# Patient Record
Sex: MALE | Race: WHITE | Employment: UNEMPLOYED | URBAN - METROPOLITAN AREA
[De-identification: names, ages, dates, MRNs, and addresses within clinical notes are randomized per-mention and may not be internally consistent; named-entity substitution may affect disease eponyms.]

---
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
## 2019-05-06 ENCOUNTER — APPOINTMENT (OUTPATIENT)
Dept: GENERAL RADIOLOGY | Facility: CLINIC | Age: 19
End: 2019-05-06
Attending: PHYSICIAN ASSISTANT
Payer: OTHER MISCELLANEOUS

## 2019-05-06 ENCOUNTER — HOSPITAL ENCOUNTER (EMERGENCY)
Facility: CLINIC | Age: 19
Discharge: HOME | End: 2019-05-06
Attending: PHYSICIAN ASSISTANT | Admitting: PHYSICIAN ASSISTANT
Payer: OTHER MISCELLANEOUS

## 2019-05-06 VITALS
WEIGHT: 180 LBS | BODY MASS INDEX: 23.11 KG/M2 | SYSTOLIC BLOOD PRESSURE: 114 MMHG | OXYGEN SATURATION: 99 % | TEMPERATURE: 97.8 F | DIASTOLIC BLOOD PRESSURE: 67 MMHG | RESPIRATION RATE: 16 BRPM

## 2019-05-06 DIAGNOSIS — S60.012A CONTUSION OF LEFT THUMB: ICD-10-CM

## 2019-05-06 DIAGNOSIS — S60.10XA SUBUNGUAL HEMATOMA OF DIGIT OF HAND: ICD-10-CM

## 2019-05-06 PROCEDURE — 99283 EMERGENCY DEPT VISIT LOW MDM: CPT | Mod: 25 | Performed by: PHYSICIAN ASSISTANT

## 2019-05-06 PROCEDURE — 29125 APPL SHORT ARM SPLINT STATIC: CPT | Mod: LT | Performed by: PHYSICIAN ASSISTANT

## 2019-05-06 PROCEDURE — 99284 EMERGENCY DEPT VISIT MOD MDM: CPT | Mod: 25 | Performed by: PHYSICIAN ASSISTANT

## 2019-05-06 PROCEDURE — 11740 EVACUATION SUBUNGUAL HMTMA: CPT | Mod: FA | Performed by: PHYSICIAN ASSISTANT

## 2019-05-06 PROCEDURE — 73140 X-RAY EXAM OF FINGER(S): CPT | Mod: TC,LT

## 2019-05-06 NOTE — ED AVS SNAPSHOT
[facility] Emergency Department  [address] [place] [address]  Phone:  [phone]  Fax:  [phone]                                    Adi Limon   MRN: [id number]    Department:  [facility] Emergency Department   Date of Visit:  5/6/2019           After Visit Summary Signature Page    I have received my discharge instructions, and my questions have been answered. I have discussed any challenges I see with this plan with the nurse or doctor.    ..........................................................................................................................................  Patient/Patient Representative Signature      ..........................................................................................................................................  Patient Representative Print Name and Relationship to Patient    ..................................................               ................................................  Date                                   Time    ..........................................................................................................................................  Reviewed by Signature/Title    ...................................................              ..............................................  Date                                               Time          22EPIC Rev 08/18

## 2019-05-07 NOTE — ED PROVIDER NOTES
History     Chief Complaint   Patient presents with     Thumb Discomfort     HPI  Adi Limon is a 19 year old male who presents for evaluation of left thumb discomfort.  Initial injury was 2 months prior when he smashed his finger.  Developed a significant subungual hematoma, and he heated up a piece of metal and open the nail plate himself.  This did relieve his discomfort.  Has had some decreased range of motion since then, but has been able to work and function okay.  2 days ago he was using an impact wrench and it accidentally slipped off the not that he was working on.  It struck his left nail plate of the thumb again.  Has had increased pain, swelling, and decreased range of motion ever since this injury.  Reports his pain about 6 on a scale of 10.  Has attempted ibuprofen for the pain.        Allergies:  Allergies   Allergen Reactions     No Known Allergies        Problem List:    Patient Active Problem List    Diagnosis Date Noted     ADD (attention deficit hyperactivity disorder, inattentive type) 01/05/2016     Priority: Medium     Memory deficit 01/05/2016     Priority: Medium     Allergic rhinitis 04/21/2008     Priority: Medium     Problem list name updated by automated process. Provider to review          Past Medical History:    Past Medical History:   Diagnosis Date     NO ACTIVE PROBLEMS      Personal history of multiple concussions        Past Surgical History:    Past Surgical History:   Procedure Laterality Date     HC REMOVE TONSILS/ADENOIDS,<11 Y/O  7/5/2005    T & A <11y.o.       Family History:    History reviewed. No pertinent family history.    Social History:  Marital Status:  Single [1]  Social History     Tobacco Use     Smoking status: Never Smoker     Smokeless tobacco: Never Used   Substance Use Topics     Alcohol use: No     Drug use: No        Medications:      Acetaminophen (TYLENOL PO)   fexofenadine (ALLEGRA) 30 MG tablet   HYDROcodone-acetaminophen (NORCO) 5-325 MG per tablet    IBUPROFEN PO   naproxen (NAPROSYN) 500 MG tablet         Review of Systems   All other systems reviewed and are negative.      Physical Exam   BP: 114/67  Heart Rate: 91  Temp: 97.8  F (36.6  C)  Resp: 16  Weight: 81.6 kg (180 lb)  SpO2: 99 %      Physical Exam   Nursing note and vitals reviewed.  Left thumb with minor swelling of the distal phalanx.  Significant subungual hematoma.  Decreased range of motion with flexion and extension secondary to pain.  He is able to move the thumb.  MCP joint without tenderness.  Remainder the hand normal to inspection and no tenderness to palpation.    ED Course        Procedures               Critical Care time:  none               Results for orders placed or performed during the hospital encounter of 05/06/19 (from the past 24 hour(s))   XR Finger Left G/E 2 Views    Narrative    LEFT FINGER TWO OR MORE VIEWS  5/6/2019 7:32 PM     HISTORY: Trauma.    COMPARISON: None.      Impression    IMPRESSION: No bony or soft tissue abnormality.       SADE LAW MD       Medications - No data to display    Assessments & Plan (with Medical Decision Making)     Contusion of left thumb  Subungual hematoma of digit of hand     19 year old male presents for evaluation of increased thumb discomfort after reinjuring it yesterday.  He was using an impact wrench and excellently slipped and struck his distal thumb.  Previous injury in February where he released a subungual hematoma on his own.  Notes increased pain, swelling, and decreased range of motion since his injury yesterday.  On exam vital signs normal.  Significant subungual hematoma noted.  Minor swelling of the distal phalanx of the thumb.  He does have fair range of motion of the thumb, but decreased secondary to pain and swelling.  Sensation intact.  X-ray negative for fracture.  We did treat the subungual hematoma with a cautery pen.  Hold burns through the nail plate without complication and there is a fair amount of blood  that did come out.  Bacitracin ointment and a bandage applied.  We soaked his thumb in hot water for 20 minutes prior to putting a bandage on.  He was given a thumb spica brace to support his stomach prevent further injury given his job as a .  The patient does not want any work restrictions.  Return in 1 week if symptoms worsening or not improving.  I encouraged him to soak his thumb at home for 20 minutes every 1-2 hours this evening.  Ibuprofen 600 mg every 6 hours as needed for discomfort.  Elevate the hand is much as possible.  The patient was in agreement with this plan and was suitable for discharge.     I have reviewed the nursing notes.    I have reviewed the findings, diagnosis, plan and need for follow up with the patient.          Medication List      There are no discharge medications for this visit.         Final diagnoses:   Contusion of left thumb   Subungual hematoma of digit of hand       Disclaimer: This note consists of symbols derived from keyboarding, dictation and/or voice recognition software. As a result, there may be errors in the script that have gone undetected. Please consider this when interpreting information found in this chart.      5/6/2019   Tommie Troy PA-C   [facility] [place] EMERGENCY DEPARTMENT     Tommie Troy PA-C  05/07/19 0022

## 2019-05-07 NOTE — ED TRIAGE NOTES
Injured left thumb in February.  Friday re injured it and it was hurting a lot until Saturday when it stopped hurting and now it is just tingling and concern for decreased ROM of thumb.

## 2019-05-07 NOTE — DISCHARGE INSTRUCTIONS
Please soak your finger in warm water for 20 minutes every 1-2 hours tonight.    Use a bandage with bacitracin in between soakings tonight.    Use the brace to support your finger while it heals.

## 2020-07-28 ENCOUNTER — APPOINTMENT (OUTPATIENT)
Dept: ULTRASOUND IMAGING | Facility: CLINIC | Age: 20
End: 2020-07-28
Attending: NURSE PRACTITIONER
Payer: COMMERCIAL

## 2020-07-28 ENCOUNTER — HOSPITAL ENCOUNTER (EMERGENCY)
Facility: CLINIC | Age: 20
Discharge: HOME | End: 2020-07-28
Attending: NURSE PRACTITIONER | Admitting: NURSE PRACTITIONER
Payer: COMMERCIAL

## 2020-07-28 ENCOUNTER — VIRTUAL VISIT (OUTPATIENT)
Dept: FAMILY MEDICINE | Facility: OTHER | Age: 20
End: 2020-07-28
Payer: COMMERCIAL

## 2020-07-28 VITALS
WEIGHT: 185.9 LBS | BODY MASS INDEX: 23.87 KG/M2 | HEART RATE: 78 BPM | DIASTOLIC BLOOD PRESSURE: 67 MMHG | TEMPERATURE: 98.6 F | SYSTOLIC BLOOD PRESSURE: 121 MMHG | RESPIRATION RATE: 16 BRPM | OXYGEN SATURATION: 97 %

## 2020-07-28 DIAGNOSIS — R10.13 EPIGASTRIC PAIN: Primary | ICD-10-CM

## 2020-07-28 DIAGNOSIS — R10.13 EPIGASTRIC PAIN: ICD-10-CM

## 2020-07-28 DIAGNOSIS — R11.0 NAUSEA: ICD-10-CM

## 2020-07-28 LAB
ALBUMIN SERPL-MCNC: 4.6 G/DL (ref 3.4–5)
ALP SERPL-CCNC: 75 U/L (ref 40–150)
ALT SERPL W P-5'-P-CCNC: 29 U/L (ref 0–70)
ANION GAP SERPL CALCULATED.3IONS-SCNC: 4 MMOL/L (ref 3–14)
AST SERPL W P-5'-P-CCNC: 15 U/L (ref 0–45)
BASOPHILS # BLD AUTO: 0.1 10E9/L (ref 0–0.2)
BASOPHILS NFR BLD AUTO: 0.7 %
BILIRUB SERPL-MCNC: 0.7 MG/DL (ref 0.2–1.3)
BUN SERPL-MCNC: 14 MG/DL (ref 7–30)
CALCIUM SERPL-MCNC: 9.4 MG/DL (ref 8.5–10.1)
CHLORIDE SERPL-SCNC: 109 MMOL/L (ref 94–109)
CO2 SERPL-SCNC: 29 MMOL/L (ref 20–32)
CREAT SERPL-MCNC: 0.87 MG/DL (ref 0.66–1.25)
CRP SERPL-MCNC: <2.9 MG/L (ref 0–8)
DIFFERENTIAL METHOD BLD: NORMAL
EOSINOPHIL NFR BLD AUTO: 4.6 %
ERYTHROCYTE [DISTWIDTH] IN BLOOD BY AUTOMATED COUNT: 12.1 % (ref 10–15)
GFR SERPL CREATININE-BSD FRML MDRD: >90 ML/MIN/{1.73_M2}
GLUCOSE SERPL-MCNC: 77 MG/DL (ref 70–99)
HCT VFR BLD AUTO: 43.7 % (ref 40–53)
HGB BLD-MCNC: 15.5 G/DL (ref 13.3–17.7)
IMM GRANULOCYTES # BLD: 0 10E9/L (ref 0–0.4)
IMM GRANULOCYTES NFR BLD: 0.2 %
LIPASE SERPL-CCNC: 57 U/L (ref 73–393)
LYMPHOCYTES # BLD AUTO: 3.2 10E9/L (ref 0.8–5.3)
LYMPHOCYTES NFR BLD AUTO: 36.4 %
MCH RBC QN AUTO: 32.4 PG (ref 26.5–33)
MCHC RBC AUTO-ENTMCNC: 35.5 G/DL (ref 31.5–36.5)
MCV RBC AUTO: 91 FL (ref 78–100)
MONOCYTES # BLD AUTO: 0.7 10E9/L (ref 0–1.3)
MONOCYTES NFR BLD AUTO: 7.6 %
NEUTROPHILS # BLD AUTO: 4.4 10E9/L (ref 1.6–8.3)
NEUTROPHILS NFR BLD AUTO: 50.5 %
NRBC # BLD AUTO: 0 10*3/UL
NRBC BLD AUTO-RTO: 0 /100
PLATELET # BLD AUTO: 194 10E9/L (ref 150–450)
POTASSIUM SERPL-SCNC: 4.1 MMOL/L (ref 3.4–5.3)
PROT SERPL-MCNC: 7.6 G/DL (ref 6.8–8.8)
RBC # BLD AUTO: 4.79 10E12/L (ref 4.4–5.9)
SODIUM SERPL-SCNC: 142 MMOL/L (ref 133–144)
WBC # BLD AUTO: 8.7 10E9/L (ref 4–11)

## 2020-07-28 PROCEDURE — 80053 COMPREHEN METABOLIC PANEL: CPT | Performed by: NURSE PRACTITIONER

## 2020-07-28 PROCEDURE — 99284 EMERGENCY DEPT VISIT MOD MDM: CPT | Mod: Z6 | Performed by: NURSE PRACTITIONER

## 2020-07-28 PROCEDURE — 99285 EMERGENCY DEPT VISIT HI MDM: CPT | Mod: 25 | Performed by: NURSE PRACTITIONER

## 2020-07-28 PROCEDURE — 96361 HYDRATE IV INFUSION ADD-ON: CPT | Performed by: NURSE PRACTITIONER

## 2020-07-28 PROCEDURE — 25800030 ZZH RX IP 258 OP 636: Performed by: NURSE PRACTITIONER

## 2020-07-28 PROCEDURE — 86140 C-REACTIVE PROTEIN: CPT | Performed by: NURSE PRACTITIONER

## 2020-07-28 PROCEDURE — 96374 THER/PROPH/DIAG INJ IV PUSH: CPT | Performed by: NURSE PRACTITIONER

## 2020-07-28 PROCEDURE — 99203 OFFICE O/P NEW LOW 30 MIN: CPT | Mod: 95 | Performed by: PHYSICIAN ASSISTANT

## 2020-07-28 PROCEDURE — 83690 ASSAY OF LIPASE: CPT | Performed by: NURSE PRACTITIONER

## 2020-07-28 PROCEDURE — 25000128 H RX IP 250 OP 636: Performed by: NURSE PRACTITIONER

## 2020-07-28 PROCEDURE — 85025 COMPLETE CBC W/AUTO DIFF WBC: CPT | Performed by: NURSE PRACTITIONER

## 2020-07-28 PROCEDURE — C9113 INJ PANTOPRAZOLE SODIUM, VIA: HCPCS | Performed by: NURSE PRACTITIONER

## 2020-07-28 PROCEDURE — 76705 ECHO EXAM OF ABDOMEN: CPT

## 2020-07-28 PROCEDURE — 25000132 ZZH RX MED GY IP 250 OP 250 PS 637: Performed by: NURSE PRACTITIONER

## 2020-07-28 RX ORDER — ONDANSETRON 2 MG/ML
4 INJECTION INTRAMUSCULAR; INTRAVENOUS EVERY 30 MIN PRN
Status: DISCONTINUED | OUTPATIENT
Start: 2020-07-28 | End: 2020-07-28 | Stop reason: HOSPADM

## 2020-07-28 RX ORDER — LORAZEPAM 1 MG/1
1 TABLET ORAL ONCE
Status: COMPLETED | OUTPATIENT
Start: 2020-07-28 | End: 2020-07-28

## 2020-07-28 RX ADMIN — PANTOPRAZOLE SODIUM 40 MG: 40 INJECTION, POWDER, FOR SOLUTION INTRAVENOUS at 20:17

## 2020-07-28 RX ADMIN — SODIUM CHLORIDE 1000 ML: 9 INJECTION, SOLUTION INTRAVENOUS at 19:45

## 2020-07-28 RX ADMIN — LORAZEPAM 1 MG: 1 TABLET ORAL at 18:48

## 2020-07-28 NOTE — PROGRESS NOTES
"Adi Limon is a 20 year old male who is being evaluated via a billable telephone visit.      The patient has been notified of following:     \"This telephone visit will be conducted via a call between you and your physician/provider. We have found that certain health care needs can be provided without the need for a physical exam.  This service lets us provide the care you need with a short phone conversation.  If a prescription is necessary we can send it directly to your pharmacy.  If lab work is needed we can place an order for that and you can then stop by our lab to have the test done at a later time.    Telephone visits are billed at different rates depending on your insurance coverage. During this emergency period, for some insurers they may be billed the same as an in-person visit.  Please reach out to your insurance provider with any questions.    If during the course of the call the physician/provider feels a telephone visit is not appropriate, you will not be charged for this service.\"    Patient has given verbal consent for Telephone visit?  Yes    What phone number would you like to be contacted at? [phone]    How would you like to obtain your AVS? Mail a copy    Subjective     Adi Limon is a 20 year old male who presents via phone visit today for the following health issues:    HPI    ABDOMINAL   PAIN     Onset: 3-4 days    Description:   Character: Sharp. Some sharpness, knotted up, persistent 5-6/10, 8-9/10 at times then gets nauseated    Location: epigastric region  Radiation: Back    Intensity: moderate, severe    Progression of Symptoms:  same    Accompanying Signs & Symptoms:  Fever/Chills?: no   Gas/Bloating: no   Nausea: YES  Vomitting: no   Diarrhea?: no   Constipation:no   Dysuria or Hematuria: no   No blood in stool, NL BM   History:   Trauma: no   Previous similar pain: no    Previous tests done: none    Precipitating factors:   Does the pain change with:     Food: YES , a lot worse " with eating    BM: no     Urination: no     Alleviating factors:  none    Therapies Tried and outcome: Peptobismol-- did not help just took it less than an hour ago    LMP:  not applicable    Symptoms started over the weekend, 3 to 4 days ago when he was hiking in [place].  He did jump into the water and got some water in his mouth.  He wonders if this is associated.  He has not had diarrhea.    He has no family history of gallbladder disease.  No known exposure to COVID.  He has not been using alcohol, tobacco, caffeine, or NSAIDs.              Patient Active Problem List   Diagnosis     Allergic rhinitis     ADD (attention deficit hyperactivity disorder, inattentive type)     Memory deficit     Past Surgical History:   Procedure Laterality Date     HC REMOVE TONSILS/ADENOIDS,<13 Y/O  7/5/2005    T & A <11y.o.       Social History     Tobacco Use     Smoking status: Never Smoker     Smokeless tobacco: Never Used   Substance Use Topics     Alcohol use: No     History reviewed. No pertinent family history.      Current Outpatient Medications   Medication Sig Dispense Refill     Acetaminophen (TYLENOL PO) Take 1,000 mg by mouth every 6 hours as needed for mild pain or fever       IBUPROFEN PO Take 400 mg by mouth.         fexofenadine (ALLEGRA) 30 MG tablet Take 1 tablet (30 mg) by mouth 2 times daily Needs allergy visit before more rf (Patient not taking: Reported on 7/28/2020) 60 tablet 0     HYDROcodone-acetaminophen (NORCO) 5-325 MG per tablet Take 1 tablet by mouth every 6 hours as needed for moderate to severe pain (use for breakthru pain only) (Patient not taking: Reported on 7/28/2020) 10 tablet 0     naproxen (NAPROSYN) 500 MG tablet Take 1 tablet (500 mg) by mouth 2 times daily (with meals) (Patient not taking: Reported on 7/28/2020) 60 tablet 1     Allergies   Allergen Reactions     No Known Allergies      BP Readings from Last 3 Encounters:   05/06/19 114/67   06/27/17 139/82   03/16/17 116/66     Wt Readings from Last 3 Encounters:   05/06/19 81.6 kg (180 lb)   06/27/17 83 kg (183 lb) (87 %, Z= 1.13)*   03/16/17 81.8 kg (180 lb 6.4 oz) (86 %, Z= 1.10)*     * Growth percentiles are based on [facility] (Boys, 2-20 Years) data.               Reviewed and updated as needed this visit by Provider         Review of Systems   CONSTITUTIONAL: NEGATIVE for fever, chills, change in weight  ENT/MOUTH: NEGATIVE for ear, mouth and throat problems  RESP: NEGATIVE for significant cough or SOB  CV: NEGATIVE for chest pain, palpitations or peripheral edema  GI: Positive for abdominal pain and nausea as above       Objective   Reported vitals:  There were no vitals taken for this visit.   healthy, alert and no distress  PSYCH: Alert and oriented times 3; coherent speech, normal   rate and volume, able to articulate logical thoughts, able   to abstract reason, no tangential thoughts, no hallucinations   or delusions  His affect is normal and pleasant  RESP: No cough, no audible wheezing, able to talk in full sentences  Remainder of exam unable to be completed due to telephone visits    Diagnostic Test Results:  Labs reviewed in Epic  none         Assessment/Plan:  This visit was completed virtually due to our current COVID 19 pandemic.  The patient will be seen as soon as possible in the office.  If there is any significant change in or worsening of symptoms patient will call in to be triaged, present to the emergency department, or call 911 if acute worsening is noted.    Pt was offered in person visit, he and his mom have decided to start virtually and if symptoms worsen or if he is not improving and because of his pain is not revealed with this visit he will then have in person visit  1. Epigastric pain  Possible etiologies could be gastritis, pancreatitis, gallbladder disease, gastroenteritis, we will start her work-up with labs and ultrasound, should his symptoms acutely worsen or change he needs to be seen in person as  above  - **Comprehensive metabolic panel FUTURE anytime; Future  - **CBC with platelets FUTURE anytime; Future  - Lipase; Future  - Amylase; Future  - US Abdomen Limited; Future    2. Nausea    - **Comprehensive metabolic panel FUTURE anytime; Future  - **CBC with platelets FUTURE anytime; Future  - Lipase; Future  - Amylase; Future  - US Abdomen Limited; Future    Return in about 1 week (around 8/4/2020), or if symptoms worsen or fail to improve.      Phone call duration:  10 minutes    Amaris Wells PA-C

## 2020-07-28 NOTE — ED PROVIDER NOTES
History     Chief Complaint   Patient presents with     Abdominal Pain     The history is provided by the patient and a parent.     Adi Limon is a 20 year old male who presents to the emergency department for abdominal pain. Patient reports having epigastric abdominal pain for 4 days. Patient states he was up at the [facility] when his pain started, after eating jerky and nuts. He reports having nausea, however, denies any fever, vomiting or diarrhea. Patient states the pain does not radiate anywhere. He states eating makes the pain worse. He last had a salad around 1600 today. Patient has had a decrease in his appetite the last couple of days. Patient has tried Pepto Bismol, Carafate and Prilosec with no relief. He denies every having this pain before. Patient did have a virtual visit today and was advised to have some blood work done along with an US. He is scheduled to have an US done, however, not until 7/30/20 and wants to get this figured out prior to the weekend. Mother reports family history of GI problems, however, denies any gallbladder problems in the family. He denies any surgeries done on his abdomen. He denies any recent alcohol use.    Allergies:  Allergies   Allergen Reactions     No Known Allergies        Problem List:    Patient Active Problem List    Diagnosis Date Noted     ADD (attention deficit hyperactivity disorder, inattentive type) 01/05/2016     Priority: Medium     Memory deficit 01/05/2016     Priority: Medium     Allergic rhinitis 04/21/2008     Priority: Medium     Problem list name updated by automated process. Provider to review          Past Medical History:    Past Medical History:   Diagnosis Date     NO ACTIVE PROBLEMS      Personal history of multiple concussions        Past Surgical History:    Past Surgical History:   Procedure Laterality Date     HC REMOVE TONSILS/ADENOIDS,<11 Y/O  7/5/2005    T & A <11y.o.       Family History:    History reviewed. No pertinent family  history.    Social History:  Marital Status:  Single [1]  Social History     Tobacco Use     Smoking status: Never Smoker     Smokeless tobacco: Never Used   Substance Use Topics     Alcohol use: No     Drug use: No        Medications:    Acetaminophen (TYLENOL PO)  fexofenadine (ALLEGRA) 30 MG tablet  HYDROcodone-acetaminophen (NORCO) 5-325 MG per tablet  IBUPROFEN PO  naproxen (NAPROSYN) 500 MG tablet          Review of Systems   All other systems reviewed and are negative.      Physical Exam   BP: 136/81  Heart Rate: 62  Temp: 98.6  F (37  C)  Resp: 16  Weight: 84.3 kg (185 lb 14.4 oz)  SpO2: 98 %      Physical Exam  Vitals signs and nursing note reviewed.   Constitutional:       General: He is not in acute distress.     Appearance: Normal appearance. He is not diaphoretic.   HENT:      Head: Atraumatic.   Eyes:      General: No scleral icterus.     Pupils: Pupils are equal, round, and reactive to light.   Neck:      Musculoskeletal: Normal range of motion.   Cardiovascular:      Rate and Rhythm: Normal rate and regular rhythm.      Heart sounds: Normal heart sounds.   Pulmonary:      Effort: Pulmonary effort is normal. No respiratory distress.      Breath sounds: Normal breath sounds.   Abdominal:      General: Bowel sounds are normal.      Palpations: Abdomen is soft.      Tenderness: There is abdominal tenderness (mild mid epigastric).   Musculoskeletal: Normal range of motion.         General: No tenderness.   Skin:     General: Skin is warm.      Findings: No rash.   Neurological:      Mental Status: He is alert.         ED Course        Procedures    Results for orders placed or performed during the hospital encounter of 07/28/20 (from the past 24 hour(s))   CBC with platelets differential   Result Value Ref Range    WBC 8.7 4.0 - 11.0 10e9/L    RBC Count 4.79 4.4 - 5.9 10e12/L    Hemoglobin 15.5 13.3 - 17.7 g/dL    Hematocrit 43.7 40.0 - 53.0 %    MCV 91 78 - 100 fl    MCH 32.4 26.5 - 33.0 pg    MCHC 35.5  31.5 - 36.5 g/dL    RDW 12.1 10.0 - 15.0 %    Platelet Count 194 150 - 450 10e9/L    Diff Method Automated Method     % Neutrophils 50.5 %    % Lymphocytes 36.4 %    % Monocytes 7.6 %    % Eosinophils 4.6 %    % Basophils 0.7 %    % Immature Granulocytes 0.2 %    Nucleated RBCs 0 0 /100    Absolute Neutrophil 4.4 1.6 - 8.3 10e9/L    Absolute Lymphocytes 3.2 0.8 - 5.3 10e9/L    Absolute Monocytes 0.7 0.0 - 1.3 10e9/L    Absolute Basophils 0.1 0.0 - 0.2 10e9/L    Abs Immature Granulocytes 0.0 0 - 0.4 10e9/L    Absolute Nucleated RBC 0.0    Comprehensive metabolic panel   Result Value Ref Range    Sodium 142 133 - 144 mmol/L    Potassium 4.1 3.4 - 5.3 mmol/L    Chloride 109 94 - 109 mmol/L    Carbon Dioxide 29 20 - 32 mmol/L    Anion Gap 4 3 - 14 mmol/L    Glucose 77 70 - 99 mg/dL    Urea Nitrogen 14 7 - 30 mg/dL    Creatinine 0.87 0.66 - 1.25 mg/dL    GFR Estimate >90 >60 mL/min/[1.73_m2]    GFR Estimate If Black >90 >60 mL/min/[1.73_m2]    Calcium 9.4 8.5 - 10.1 mg/dL    Bilirubin Total 0.7 0.2 - 1.3 mg/dL    Albumin 4.6 3.4 - 5.0 g/dL    Protein Total 7.6 6.8 - 8.8 g/dL    Alkaline Phosphatase 75 40 - 150 U/L    ALT 29 0 - 70 U/L    AST 15 0 - 45 U/L   Lipase   Result Value Ref Range    Lipase 57 (L) 73 - 393 U/L   CRP inflammation   Result Value Ref Range    CRP Inflammation <2.9 0.0 - 8.0 mg/L       Medications   0.9% sodium chloride BOLUS (1,000 mLs Intravenous New Bag 7/28/20 2017)   ondansetron (ZOFRAN) injection 4 mg (has no administration in time range)   LORazepam (ATIVAN) tablet 1 mg (1 mg Oral Given 7/28/20 1848)   pantoprazole (PROTONIX) 40 mg IV push injection (40 mg Intravenous Given 7/28/20 2017)       Assessments & Plan (with Medical Decision Making)  Epigastric pain  Likely gastritis whether this is viral or perhaps peptic ulcer disease.  Ultrasound of gallbladder obtained to rule out any gallbladder involvement which given his exam findings are felt unlikely however mom did voice concern about this.   Ultrasound was unremarkable.  Blood work was reassuring with a normal lipase, LFTs and bilirubin, CRP is negative, white count is stable.  Patient was given IV fluids here as well as IV Zofran and Protonix and is feeling somewhat better.  Patient was given oral Ativan prior to IV placement and blood draw as he is prone to passing out with this and did okay.  I am going to place patient on Prilosec daily for the next 30 days in case this is related to peptic ulcer disease.  I would like him to follow-up with his primary care provider in the next week.  Avoid any greasy fatty foods for the next few days.  He declined any need for Zofran at home.  Reasons to return to the emergency room discussed.  Patient and mom agreeable to plan of care and patient was discharged in stable condition.     I have reviewed the nursing notes.    I have reviewed the findings, diagnosis, plan and need for follow up with the patient.    New Prescriptions    OMEPRAZOLE (PRILOSEC) 20 MG DR CAPSULE    Take 1 capsule (20 mg) by mouth daily       Final diagnoses:   Epigastric pain     This document serves as a record of services personally performed by Haritha Shi CNP. It was created on their behalf by Echo Juarez, a trained medical scribe. The creation of this record is based on the provider's personal observations and the statements of the patient. This document has been checked and approved by the attending provider.    Note: Chart documentation done in part with Dragon Voice Recognition software. Although reviewed after completion, some word and grammatical errors may remain.    7/28/2020   [facility] EMERGENCY DEPARTMENT     Donna Shi APRN CNP  07/28/20 2020

## 2020-07-28 NOTE — ED AVS SNAPSHOT
[facility] Emergency Department  [address] [place] [address]  Phone:  [phone]  Fax:  [phone]                                    Adi Limon   MRN: [id number]    Department:  [facility] Emergency Department   Date of Visit:  7/28/2020           After Visit Summary Signature Page    I have received my discharge instructions, and my questions have been answered. I have discussed any challenges I see with this plan with the nurse or doctor.    ..........................................................................................................................................  Patient/Patient Representative Signature      ..........................................................................................................................................  Patient Representative Print Name and Relationship to Patient    ..................................................               ................................................  Date                                   Time    ..........................................................................................................................................  Reviewed by Signature/Title    ...................................................              ..............................................  Date                                               Time          22EPIC Rev 08/18

## 2020-07-28 NOTE — ED TRIAGE NOTES
Has been having mid upper abdominal pain since Saturday. Call his provider today who said to come to the ER to get some blood work and an ultrasound.   Patient's airway, breathing, circulation, and disability/mental status (ABCDs) intact/WDL during triage..

## 2020-07-29 NOTE — DISCHARGE INSTRUCTIONS
Start Prilosec tomorrow evening and take every evening for the next 30 days.  Stay well-hydrated.  Avoid any greasy or fatty foods for the next week.  I would like you to follow-up with your primary care provider in the next 1 to 2 weeks for reevaluation.  If you develop any worsening symptoms or new concerns over the weekend please return here for further evaluation.

## 2020-07-29 NOTE — ED NOTES
Reviewed discharge instruction, verbalized understanding. No questions or concerns. IV removed. Meds reviewed. VS reassessed.